# Patient Record
Sex: FEMALE | Race: WHITE | NOT HISPANIC OR LATINO | Employment: FULL TIME | URBAN - METROPOLITAN AREA
[De-identification: names, ages, dates, MRNs, and addresses within clinical notes are randomized per-mention and may not be internally consistent; named-entity substitution may affect disease eponyms.]

---

## 2021-04-09 DIAGNOSIS — Z23 ENCOUNTER FOR IMMUNIZATION: ICD-10-CM

## 2022-03-11 ENCOUNTER — ANNUAL EXAM (OUTPATIENT)
Dept: OBGYN CLINIC | Facility: CLINIC | Age: 62
End: 2022-03-11
Payer: COMMERCIAL

## 2022-03-11 VITALS
SYSTOLIC BLOOD PRESSURE: 110 MMHG | WEIGHT: 153 LBS | DIASTOLIC BLOOD PRESSURE: 70 MMHG | BODY MASS INDEX: 23.19 KG/M2 | HEIGHT: 68 IN

## 2022-03-11 DIAGNOSIS — Z11.3 ROUTINE SCREENING FOR STI (SEXUALLY TRANSMITTED INFECTION): ICD-10-CM

## 2022-03-11 DIAGNOSIS — Z12.11 SCREEN FOR COLON CANCER: ICD-10-CM

## 2022-03-11 DIAGNOSIS — Z12.31 ENCOUNTER FOR SCREENING MAMMOGRAM FOR MALIGNANT NEOPLASM OF BREAST: ICD-10-CM

## 2022-03-11 DIAGNOSIS — Z12.4 SCREENING FOR CERVICAL CANCER: ICD-10-CM

## 2022-03-11 DIAGNOSIS — Z01.419 WOMEN'S ANNUAL ROUTINE GYNECOLOGICAL EXAMINATION: Primary | ICD-10-CM

## 2022-03-11 PROCEDURE — S0612 ANNUAL GYNECOLOGICAL EXAMINA: HCPCS | Performed by: STUDENT IN AN ORGANIZED HEALTH CARE EDUCATION/TRAINING PROGRAM

## 2022-03-11 RX ORDER — FAMOTIDINE 20 MG/1
20 TABLET, FILM COATED ORAL 2 TIMES DAILY
COMMUNITY

## 2022-03-11 NOTE — PROGRESS NOTES
[address], [address], [facility], [address] [facility]    ASSESSMENT/PLAN: Chiara Jackman is a 65 y o  [id number] who presents for annual gynecologic exam     Encounter for routine gynecologic examination  - Routine well woman exam completed today  - Cervical Cancer Screening: Current ASCCP Guidelines reviewed  Last Pap: 11/30/2017   History of abnormal: None  Repeat collected today    - STI screening offered including HIV testing: GC/CT and blood work ordered  - Breast Cancer Screening: Last Mammogram Not on file, overdue for repeat  Ordered today  - Colorectal cancer screening was ordered  - The following were reviewed in today's visit: breast self exam, mammography screening ordered, menopause, adequate intake of calcium and vitamin D, exercise and healthy diet    Additional problems addressed during this visit:  1  Women's annual routine gynecological examination    2  Encounter for screening mammogram for malignant neoplasm of breast  -     Mammo screening bilateral w 3d & cad; Future    3  Screening for cervical cancer  -     IGP,CtNg,AptimaHPV,rfx16/18,45    4  Screen for colon cancer  -     Ambulatory Referral to Gastroenterology; Future    5  Routine screening for STI (sexually transmitted infection)  -     IGP,CtNg,AptimaHPV,rfx16/18,45  -     Human Immunodeficiency Virus 1/2 Antigen / Antibody ( Fourth Generation) with Reflex Testing; Future  -     RPR, Rfx Qn RPR/Confirm TP; Future  -     Hepatitis C antibody; Future  -     Human Immunodeficiency Virus 1/2 Antigen / Antibody ( Fourth Generation) with Reflex Testing  -     RPR, Rfx Qn RPR/Confirm TP  -     Hepatitis C antibody        CC:  Annual Gynecologic Examination    HPI: Chiara Jackman is a 65 y o  [id number] who presents for annual gynecologic examination  She is without complaint today  ROS: Negative except as noted in HPI    No LMP recorded   Patient is postmenopausal   Menstrual History  Period Duration (Days): Menopausal x 3-5 years    She  reports being sexually active and has had partner(s) who are male  She reports using the following method of birth control/protection: Male Sterilization  Sexual History  Sexual Assault: No  Sexually Transmitted Infection History: None  Multiple Sex Partners: No  Is Patient in a Monogamous Relationship?: Yes    The following portions of the patient's history were reviewed and updated as appropriate:  Past Medical History:   Diagnosis Date    Peptic ulcer     Shingles     persistent ocular shingles - prednisone drops     Past Surgical History:   Procedure Laterality Date    COLONOSCOPY  10/2014    FRACTURE SURGERY Right     right arm fracture reconstruction    MAMMO (HISTORICAL)  10/07/2019    BIRADS 1 C    TONSILLECTOMY AND ADENOIDECTOMY       Family History   Problem Relation Age of Onset    Breast cancer Maternal Grandmother 72    Ovarian cancer Neg Hx     Uterine cancer Neg Hx     Colon cancer Neg Hx      Social History     Socioeconomic History    Marital status: /Civil Union     Spouse name: None    Number of children: None    Years of education: None    Highest education level: None   Occupational History    None   Tobacco Use    Smoking status: Former Smoker     Packs/day: 0 50     Quit date: 2012     Years since quitting: 10 1    Smokeless tobacco: Never Used   Vaping Use    Vaping Use: Never used   Substance and Sexual Activity    Alcohol use:  Yes     Alcohol/week: 6 0 standard drinks     Types: 6 Standard drinks or equivalent per week    Drug use: Never    Sexual activity: Yes     Partners: Male     Birth control/protection: Male Sterilization   Other Topics Concern    None   Social History Narrative    Exercise: up to 5 times a week    Domestic violence: No         Social Determinants of Health     Financial Resource Strain: Not on file   Food Insecurity: Not on file   Transportation Needs: Not on file   Physical Activity: Not on file   Stress: Not on file Social Connections: Not on file   Intimate Partner Violence: Not on file   Housing Stability: Not on file     Outpatient Medications Marked as Taking for the 3/11/22 encounter (Annual Exam) with Aj William MD   Medication    famotidine (PEPCID) 20 mg tablet     No Known Allergies        Objective:  /70 (BP Location: Left arm, Patient Position: Sitting, Cuff Size: Standard)   Ht 5' 8" (1 727 m)   Wt 69 4 kg (153 lb)   BMI 23 26 kg/m²        Chaperone present? Yes: Lissette Apodaca MA  General Appearance: alert and oriented, in no acute distress  Neck/Thyroid: No thyromegaly, no thyroid nodules  Respiratory: Unlabored breathing  Cardiovascular: Regular rate, no peripheral edema  Abdomen: Soft, non-tender, non-distended, no masses, no rebound or guarding  Breast Exam: No dimpling, nipple retraction or discharge  No lumps or masses  No axillary or supraclavicular nodes  Pelvic:       External genitalia: Normal appearance, no abnormal pigmentation, no lesions or masses  Normal Bartholin's and [place]'s  Urinary system: Urethral meatus normal, bladder non-tender  Vaginal: normal mucosa without prolapse or lesions  Normal-appearing physiologic discharge  Cervix: Normal-appearing, well-epithelialized, no gross lesions or masses  No cervical motion tenderness  Adnexa: No adnexal masses or tenderness noted  Uterus: Normal-sized, regular contour, midline, mobile, no uterine tenderness  Extremities: Normal range of motion  Warm, well-perfused, non-tender     Skin: normal, no rash or abnormalities  Neurologic: alert, oriented x3  Psychiatric: Appropriate affect, mood stable, cooperative with exam     Rolo Blood MD  3/11/2022 8:25 AM

## 2022-03-16 LAB
C TRACH RRNA CVX QL NAA+PROBE: NEGATIVE
CYTOLOGIST CVX/VAG CYTO: NORMAL
DX ICD CODE: NORMAL
HPV I/H RISK 4 DNA CVX QL PROBE+SIG AMP: NEGATIVE
N GONORRHOEA RRNA CVX QL NAA+PROBE: NEGATIVE
OTHER STN SPEC: NORMAL
PATH REPORT.FINAL DX SPEC: NORMAL
SL AMB NOTE:: NORMAL
SL AMB SPECIMEN ADEQUACY: NORMAL
SL AMB TEST METHODOLOGY: NORMAL

## 2022-04-29 ENCOUNTER — TELEPHONE (OUTPATIENT)
Dept: GASTROENTEROLOGY | Facility: CLINIC | Age: 62
End: 2022-04-29

## 2022-06-15 DIAGNOSIS — Z12.31 ENCOUNTER FOR SCREENING MAMMOGRAM FOR MALIGNANT NEOPLASM OF BREAST: ICD-10-CM

## 2022-06-17 LAB
EXTERNAL HIV SCREEN: NORMAL
HBA1C MFR BLD HPLC: 5.4 %
HCV AB SER-ACNC: NEGATIVE

## 2022-06-18 LAB
HCV AB S/CO SERPL IA: <0.1 S/CO RATIO (ref 0–0.9)
HIV 1+2 AB+HIV1 P24 AG SERPL QL IA: NON REACTIVE
RPR SER QL: NON REACTIVE

## 2022-07-22 ENCOUNTER — TELEPHONE (OUTPATIENT)
Dept: GASTROENTEROLOGY | Facility: AMBULARY SURGERY CENTER | Age: 62
End: 2022-07-22

## 2022-07-22 NOTE — TELEPHONE ENCOUNTER
Called pt back and discussed the need for office visit to evaluate ongoing stomach issues and gerd- canceled colonoscopy and will start with 9/12 office visit with Russell Conde in [place] and colon/egd can be arranged if ordered    Pt agreeable to this plan

## 2022-07-22 NOTE — TELEPHONE ENCOUNTER
Patients GI provider:  Dr Ting Knox     Number to return call: [phone]     Reason for call: Pt calling requesting to speak with someone regarding adding EGD with the colonoscopy scheduled on 8/12/22     Scheduled procedure/appointment date if applicable: Apt/procedure 8/12/22

## 2022-09-12 ENCOUNTER — TELEPHONE (OUTPATIENT)
Dept: GASTROENTEROLOGY | Facility: CLINIC | Age: 62
End: 2022-09-12

## 2022-09-12 ENCOUNTER — OFFICE VISIT (OUTPATIENT)
Dept: GASTROENTEROLOGY | Facility: CLINIC | Age: 62
End: 2022-09-12
Payer: COMMERCIAL

## 2022-09-12 VITALS
WEIGHT: 155 LBS | HEIGHT: 68 IN | SYSTOLIC BLOOD PRESSURE: 108 MMHG | HEART RATE: 59 BPM | DIASTOLIC BLOOD PRESSURE: 60 MMHG | BODY MASS INDEX: 23.49 KG/M2

## 2022-09-12 DIAGNOSIS — K21.9 GASTROESOPHAGEAL REFLUX DISEASE, UNSPECIFIED WHETHER ESOPHAGITIS PRESENT: Primary | ICD-10-CM

## 2022-09-12 DIAGNOSIS — Z12.11 SCREENING FOR COLON CANCER: ICD-10-CM

## 2022-09-12 DIAGNOSIS — Z12.11 SCREENING FOR COLON CANCER: Primary | ICD-10-CM

## 2022-09-12 DIAGNOSIS — R13.19 ESOPHAGEAL DYSPHAGIA: ICD-10-CM

## 2022-09-12 PROCEDURE — 99203 OFFICE O/P NEW LOW 30 MIN: CPT | Performed by: REGISTERED NURSE

## 2022-09-12 RX ORDER — VALACYCLOVIR HYDROCHLORIDE 1 G/1
1000 TABLET, FILM COATED ORAL 2 TIMES DAILY
COMMUNITY
Start: 2022-08-24

## 2022-09-12 NOTE — PROGRESS NOTES
[id number] - Outpatient Consultation  Lucy Neely 65 y o  female MRN: [id number]  Encounter: [id number]    ASSESSMENT AND PLAN:      1  Gastroesophageal reflux disease, unspecified whether esophagitis present  2  Esophageal dysphagia  Increasing GERD symptoms over the past year and a half to 2 years  No real risk factors for peptic ulcer disease  She is concerned about celiac and H pylori so we discussed biopsies during upper endoscopy  In the meantime we discussed anti-reflux diet as well as dysphagia diet since she complains of intermittent dysphagia with hummus and crackers  Differential diagnosis likely gastritis  Okay to take Tums and famotidine p r n  Yoana Skipper - EGD at [facility])    3  Screening for colon cancer  Previous colonoscopy in 2012 with a 10 year recall  Currently due for colonoscopy  - Colonoscopy at [facility])  - Sod Picosulfate-Mag Ox-Cit Acd 10-3 5-12 MG-GM -GM/160ML SOLN; Take 320 mL by mouth once for 1 dose  Dispense: 320 mL; Refill: 0      Followup Appointment: 3 months  ______________________________________________________________________    Chief Complaint   Patient presents with    GERD for the past year, Dysphagia     Referred by Dr Meg Lewis         HPI:   Lucy Neely is a 65y o  year old female who presents with complaints of GERD as well as due for colonoscopy  She states that she has been having GERD symptoms for the past year and a half to 2 years  This past December she had a period where her abdomen was extremely distended for about 3 days  Her PCP gave her Pepcid and steroids which seemed to help  She took the Pepcid for about 3 months without any further attacks  She does however feel intermittent upper abdominal contractions  She has also experienced more acid reflux  She did decrease her fried foods  Intermittent episodes of water brash especially if eating late in the evening    She does endorse having a few alcoholic beverages almost daily   She also admits to slight globus sensation as well as dysphagia intermittently when eating foods such as hummus and crackers  Denies any change in her bowel habits or rectal bleeding  Her appetite is good her weight is stable  Historical Information   Past Medical History:   Diagnosis Date    Peptic ulcer     Shingles     persistent ocular shingles - prednisone drops     Past Surgical History:   Procedure Laterality Date    COLONOSCOPY  10/2014    FRACTURE SURGERY Right     right arm fracture reconstruction    MAMMO (HISTORICAL)  10/07/2019    BIRADS 1 C    TONSILLECTOMY AND ADENOIDECTOMY       Social History     Substance and Sexual Activity   Alcohol Use Yes    Alcohol/week: 6 0 standard drinks    Types: 6 Standard drinks or equivalent per week     Social History     Substance and Sexual Activity   Drug Use Never     Social History     Tobacco Use   Smoking Status Former Smoker    Packs/day: 0 50    Quit date: 2012    Years since quitting: 10 7   Smokeless Tobacco Never Used     Family History   Problem Relation Age of Onset    Breast cancer Maternal Grandmother 72    Ovarian cancer Neg Hx     Uterine cancer Neg Hx     Colon cancer Neg Hx        Meds/Allergies     Current Outpatient Medications:     famotidine (PEPCID) 20 mg tablet    Sod Picosulfate-Mag Ox-Cit Acd 10-3 5-12 MG-GM -GM/160ML SOLN    valACYclovir (VALTREX) 1,000 mg tablet    No Known Allergies    PHYSICAL EXAM:    Blood pressure 108/60, pulse 59, height 5' 8" (1 727 m), weight 70 3 kg (155 lb)  Body mass index is 23 57 kg/m²  General Appearance: NAD, cooperative, alert  Eyes: Anicteric, PERRLA, EOMI  ENT:  Normocephalic, atraumatic, normal mucosa  Neck:  Supple, symmetrical, trachea midline,   Resp:  Clear to auscultation bilaterally; no rales, rhonchi or wheezing; respirations unlabored   CV:  S1 S2, Regular rate and rhythm; no murmur, rub, or gallop    GI:  Soft, non-tender, non-distended; normal bowel sounds; no masses, no organomegaly   Rectal: Deferred  Musculoskeletal: No cyanosis, clubbing or edema  Normal ROM  Skin:  No jaundice, rashes, or lesions   Heme/Lymph: No palpable cervical lymphadenopathy  Psych: Normal affect, good eye contact  Neuro: No gross deficits, AAOx3    Lab Results:   Reviewed    Radiology Results:   No results found  REVIEW OF SYSTEMS:    CONSTITUTIONAL: Denies any fever, chills, rigors, and weight loss  HEENT: No earache or tinnitus  Denies hearing loss or visual disturbances  CARDIOVASCULAR: No chest pain or palpitations  RESPIRATORY: Denies any cough, hemoptysis, shortness of breath or dyspnea on exertion  GASTROINTESTINAL: As noted in the History of Present Illness  GENITOURINARY: No problems with urination  Denies any hematuria or dysuria  NEUROLOGIC: No dizziness or vertigo, denies headaches  MUSCULOSKELETAL: Denies any muscle or joint pain  SKIN: Denies skin rashes or itching  ENDOCRINE: Denies excessive thirst  Denies intolerance to heat or cold  PSYCHOSOCIAL: Denies depression or anxiety  Denies any recent memory loss

## 2022-09-12 NOTE — TELEPHONE ENCOUNTER
Scheduled date of colonoscopy (as of today):10/20/22  Physician performing colonoscopy:Dr Tami Boyer  Location of colonoscopy:[facility]  Bowel prep reviewed with patient:Clenpiq  Instructions reviewed with patient by: David Cordero  Clearances: No

## 2022-10-17 RX ORDER — SODIUM PICOSULFATE, MAGNESIUM OXIDE, AND ANHYDROUS CITRIC ACID 10; 3.5; 12 MG/160ML; G/160ML; G/160ML
LIQUID ORAL
Qty: 320 ML | Refills: 0 | Status: SHIPPED | OUTPATIENT
Start: 2022-10-17 | End: 2022-10-20 | Stop reason: HOSPADM

## 2022-10-18 ENCOUNTER — TELEPHONE (OUTPATIENT)
Dept: GASTROENTEROLOGY | Facility: AMBULATORY SURGERY CENTER | Age: 62
End: 2022-10-18

## 2022-10-19 ENCOUNTER — ANESTHESIA (OUTPATIENT)
Dept: ANESTHESIOLOGY | Facility: AMBULATORY SURGERY CENTER | Age: 62
End: 2022-10-19

## 2022-10-19 ENCOUNTER — ANESTHESIA EVENT (OUTPATIENT)
Dept: ANESTHESIOLOGY | Facility: AMBULATORY SURGERY CENTER | Age: 62
End: 2022-10-19

## 2022-10-20 ENCOUNTER — ANESTHESIA EVENT (OUTPATIENT)
Dept: GASTROENTEROLOGY | Facility: AMBULATORY SURGERY CENTER | Age: 62
End: 2022-10-20

## 2022-10-20 ENCOUNTER — ANESTHESIA (OUTPATIENT)
Dept: GASTROENTEROLOGY | Facility: AMBULATORY SURGERY CENTER | Age: 62
End: 2022-10-20

## 2022-10-20 ENCOUNTER — HOSPITAL ENCOUNTER (OUTPATIENT)
Dept: GASTROENTEROLOGY | Facility: AMBULATORY SURGERY CENTER | Age: 62
Discharge: HOME | End: 2022-10-20

## 2022-10-20 VITALS
HEIGHT: 68 IN | HEART RATE: 58 BPM | DIASTOLIC BLOOD PRESSURE: 74 MMHG | RESPIRATION RATE: 18 BRPM | SYSTOLIC BLOOD PRESSURE: 119 MMHG | WEIGHT: 148 LBS | TEMPERATURE: 96.8 F | BODY MASS INDEX: 22.43 KG/M2 | OXYGEN SATURATION: 100 %

## 2022-10-20 DIAGNOSIS — Z12.11 SCREENING FOR COLON CANCER: ICD-10-CM

## 2022-10-20 DIAGNOSIS — R13.19 ESOPHAGEAL DYSPHAGIA: ICD-10-CM

## 2022-10-20 DIAGNOSIS — K21.9 GASTROESOPHAGEAL REFLUX DISEASE, UNSPECIFIED WHETHER ESOPHAGITIS PRESENT: ICD-10-CM

## 2022-10-20 RX ORDER — PROPOFOL 10 MG/ML
INJECTION, EMULSION INTRAVENOUS AS NEEDED
Status: DISCONTINUED | OUTPATIENT
Start: 2022-10-20 | End: 2022-10-20

## 2022-10-20 RX ORDER — LIDOCAINE HYDROCHLORIDE 10 MG/ML
INJECTION, SOLUTION EPIDURAL; INFILTRATION; INTRACAUDAL; PERINEURAL AS NEEDED
Status: DISCONTINUED | OUTPATIENT
Start: 2022-10-20 | End: 2022-10-20

## 2022-10-20 RX ORDER — SODIUM CHLORIDE, SODIUM LACTATE, POTASSIUM CHLORIDE, CALCIUM CHLORIDE 600; 310; 30; 20 MG/100ML; MG/100ML; MG/100ML; MG/100ML
50 INJECTION, SOLUTION INTRAVENOUS CONTINUOUS
Status: DISCONTINUED | OUTPATIENT
Start: 2022-10-20 | End: 2022-10-24 | Stop reason: HOSPADM

## 2022-10-20 RX ADMIN — PROPOFOL 40 MG: 10 INJECTION, EMULSION INTRAVENOUS at 08:48

## 2022-10-20 RX ADMIN — PROPOFOL 50 MG: 10 INJECTION, EMULSION INTRAVENOUS at 08:29

## 2022-10-20 RX ADMIN — PROPOFOL 50 MG: 10 INJECTION, EMULSION INTRAVENOUS at 08:18

## 2022-10-20 RX ADMIN — PROPOFOL 150 MG: 10 INJECTION, EMULSION INTRAVENOUS at 08:09

## 2022-10-20 RX ADMIN — PROPOFOL 30 MG: 10 INJECTION, EMULSION INTRAVENOUS at 08:34

## 2022-10-20 RX ADMIN — PROPOFOL 50 MG: 10 INJECTION, EMULSION INTRAVENOUS at 08:41

## 2022-10-20 RX ADMIN — PROPOFOL 30 MG: 10 INJECTION, EMULSION INTRAVENOUS at 08:50

## 2022-10-20 RX ADMIN — PROPOFOL 50 MG: 10 INJECTION, EMULSION INTRAVENOUS at 08:44

## 2022-10-20 RX ADMIN — PROPOFOL 40 MG: 10 INJECTION, EMULSION INTRAVENOUS at 08:33

## 2022-10-20 RX ADMIN — PROPOFOL 20 MG: 10 INJECTION, EMULSION INTRAVENOUS at 08:51

## 2022-10-20 RX ADMIN — PROPOFOL 30 MG: 10 INJECTION, EMULSION INTRAVENOUS at 08:12

## 2022-10-20 RX ADMIN — PROPOFOL 50 MG: 10 INJECTION, EMULSION INTRAVENOUS at 08:23

## 2022-10-20 RX ADMIN — SODIUM CHLORIDE, SODIUM LACTATE, POTASSIUM CHLORIDE, CALCIUM CHLORIDE 50 ML/HR: 600; 310; 30; 20 INJECTION, SOLUTION INTRAVENOUS at 07:51

## 2022-10-20 RX ADMIN — PROPOFOL 50 MG: 10 INJECTION, EMULSION INTRAVENOUS at 08:38

## 2022-10-20 RX ADMIN — PROPOFOL 50 MG: 10 INJECTION, EMULSION INTRAVENOUS at 08:20

## 2022-10-20 RX ADMIN — LIDOCAINE HYDROCHLORIDE 50 MG: 10 INJECTION, SOLUTION EPIDURAL; INFILTRATION; INTRACAUDAL; PERINEURAL at 08:09

## 2022-10-20 NOTE — ANESTHESIA PREPROCEDURE EVALUATION
Procedure:  PRE-OP ONLY    Relevant Problems   No relevant active problems             Anesthesia Plan  ASA Score- 2     Anesthesia Type- IV sedation with anesthesia with ASA Monitors  Additional Monitors:   Airway Plan:           Plan Factors-    Chart reviewed  Patient is not a current smoker  Induction- intravenous  Postoperative Plan-     Informed Consent- Anesthetic plan and risks discussed with patient  I personally reviewed this patient with the CRNA  Discussed and agreed on the Anesthesia Plan with the CRNA  Humberto Penny

## 2022-10-20 NOTE — ANESTHESIA POSTPROCEDURE EVALUATION
Post-Op Assessment Note    CV Status:  Stable  Pain Score: 0    Pain management: adequate     Mental Status:  Arousable and sleepy   Hydration Status:  Stable and euvolemic   PONV Controlled:  Controlled   Airway Patency:  Patent      Post Op Vitals Reviewed: Yes      Staff: CRNA         No complications documented      BP   115/67   Temp     Pulse  65   Resp  16   SpO2   100

## 2022-10-20 NOTE — ANESTHESIA PREPROCEDURE EVALUATION
Procedure:  COLONOSCOPY  EGD    Relevant Problems   No relevant active problems        Physical Exam    Airway    Mallampati score: II  TM Distance: >3 FB  Neck ROM: full     Dental   No notable dental hx     Cardiovascular      Pulmonary      Other Findings        Anesthesia Plan  ASA Score- 2     Anesthesia Type- IV sedation with anesthesia with ASA Monitors  Additional Monitors:   Airway Plan:           Plan Factors-Exercise tolerance (METS): >4 METS  Chart reviewed  Patient is not a current smoker (Former)  Induction- intravenous  Postoperative Plan-     Informed Consent- Anesthetic plan and risks discussed with patient  I personally reviewed this patient with the CRNA  Discussed and agreed on the Anesthesia Plan with the CRNA  Sneha Ross

## 2022-12-19 ENCOUNTER — TELEPHONE (OUTPATIENT)
Dept: GASTROENTEROLOGY | Facility: CLINIC | Age: 62
End: 2022-12-19

## 2023-03-24 ENCOUNTER — OFFICE VISIT (OUTPATIENT)
Dept: GASTROENTEROLOGY | Facility: CLINIC | Age: 63
End: 2023-03-24

## 2023-03-24 VITALS
SYSTOLIC BLOOD PRESSURE: 118 MMHG | BODY MASS INDEX: 23.64 KG/M2 | WEIGHT: 156 LBS | DIASTOLIC BLOOD PRESSURE: 76 MMHG | HEIGHT: 68 IN

## 2023-03-24 DIAGNOSIS — Z12.11 SCREENING FOR COLON CANCER: ICD-10-CM

## 2023-03-24 DIAGNOSIS — K21.9 GASTROESOPHAGEAL REFLUX DISEASE WITHOUT ESOPHAGITIS: Primary | ICD-10-CM

## 2023-03-24 NOTE — PROGRESS NOTES
[address] [facility] - Outpatient Follow-up Note  Jing Stinson 59 y o  female MRN: [id number]  Encounter: [id number]    ASSESSMENT AND PLAN:      1  Gastroesophageal reflux disease without esophagitis  EGD negative for Nuñez's  Patient controlling her reflux with diet alone  No longer on Pepcid     -Continue GERD lifestyle modifications  -Okay to use Tums as needed    2  Screening for colon cancer  Average risk, up-to-date  Recall 2032      Followup Appointment: As needed  ______________________________________________________________________    Chief Complaint   Patient presents with   • Follow-up     No issues     HPI: 20-year-old female here today for follow-up  Was seen in the office last  for reflux and dysphagia  EGD and colonoscopy done in 2022  Biopsies okay  Since then, patient has been really watching her diet  No longer on Pepcid  Making sure she is eating healthy  No longer smoker, quit 20 years ago  Occasionally drinks alcohol  Bowels are regular with celery juice alone  Denies any bleeding or weight loss      Historical Information   Past Medical History:   Diagnosis Date   • Peptic ulcer    • Shingles     persistent ocular shingles - prednisone drops     Past Surgical History:   Procedure Laterality Date   • COLONOSCOPY  10/2014   • COLONOSCOPY     • FRACTURE SURGERY Right     right arm fracture reconstruction   • MAMMO (HISTORICAL)  10/07/2019    BIRADS 1 C   • TONSILLECTOMY AND ADENOIDECTOMY     • UPPER GASTROINTESTINAL ENDOSCOPY       Social History     Substance and Sexual Activity   Alcohol Use Yes   • Alcohol/week: 6 0 standard drinks   • Types: 6 Standard drinks or equivalent per week     Social History     Substance and Sexual Activity   Drug Use Never     Social History     Tobacco Use   Smoking Status Former   • Packs/day: 0 50   • Types: Cigarettes   • Quit date:    • Years since quittin 2   Smokeless Tobacco Never Family History   Problem Relation Age of Onset   • Breast cancer Maternal Grandmother 72   • Ovarian cancer Neg Hx    • Uterine cancer Neg Hx    • Colon cancer Neg Hx    • Colon polyps Neg Hx          Current Outpatient Medications:   •  Cyanocobalamin (VITAMIN B 12 PO)  •  Multiple Vitamins-Minerals (ZINC PO)  •  NON FORMULARY  •  Omega-3 Fatty Acids (FISH OIL PO)  •  valACYclovir (VALTREX) 1,000 mg tablet  No Known Allergies  Reviewed medications and allergies and updated as indicated    PHYSICAL EXAM:    Blood pressure 118/76, height 5' 8" (1 727 m), weight 70 8 kg (156 lb)  Body mass index is 23 72 kg/m²  General Appearance: NAD, cooperative, alert  Eyes: Anicteric, PERRLA, EOMI  ENT:  Normocephalic, atraumatic, normal mucosa  Neck:  Supple, symmetrical, trachea midline  Resp:  Clear to auscultation bilaterally; no rales, rhonchi or wheezing; respirations unlabored   CV:  S1 S2, Regular rate and rhythm; no murmur, rub, or gallop  GI:  Soft, non-tender, non-distended; normal bowel sounds; no masses, no organomegaly   Rectal: Deferred  Musculoskeletal: No cyanosis, clubbing or edema  Normal ROM  Skin:  No jaundice, rashes, or lesions   Heme/Lymph: No palpable cervical lymphadenopathy  Psych: Normal affect, good eye contact  Neuro: No gross deficits, AAOx3    Lab Results:   No results found for: WBC, HGB, HCT, MCV, PLT  No results found for: NA, K, CL, CO2, ANIONGAP, BUN, CREATININE, GLUCOSE, GLUF, CALCIUM, CORRECTEDCA, AST, ALT, ALKPHOS, PROT, BILITOT, EGFR  No results found for: IRON, TIBC, FERRITIN  No results found for: LIPASE    Radiology Results:   No results found

## 2023-05-23 PROBLEM — Z12.11 SCREENING FOR COLON CANCER: Status: RESOLVED | Noted: 2023-03-24 | Resolved: 2023-05-23

## 2023-10-04 ENCOUNTER — ANNUAL EXAM (OUTPATIENT)
Dept: OBGYN CLINIC | Facility: CLINIC | Age: 63
End: 2023-10-04
Payer: COMMERCIAL

## 2023-10-04 VITALS
BODY MASS INDEX: 23.64 KG/M2 | WEIGHT: 156 LBS | DIASTOLIC BLOOD PRESSURE: 70 MMHG | SYSTOLIC BLOOD PRESSURE: 110 MMHG | HEIGHT: 68 IN

## 2023-10-04 DIAGNOSIS — Z01.419 ENCOUNTER FOR ANNUAL ROUTINE GYNECOLOGICAL EXAMINATION: Primary | ICD-10-CM

## 2023-10-04 DIAGNOSIS — Z12.31 ENCOUNTER FOR SCREENING MAMMOGRAM FOR MALIGNANT NEOPLASM OF BREAST: ICD-10-CM

## 2023-10-04 PROCEDURE — S0612 ANNUAL GYNECOLOGICAL EXAMINA: HCPCS | Performed by: STUDENT IN AN ORGANIZED HEALTH CARE EDUCATION/TRAINING PROGRAM

## 2023-10-04 NOTE — PROGRESS NOTES
[address] [place] [address], [address], [facility], [address] [facility],    ASSESSMENT/PLAN: Ariadna Fitzgerald is a 59 y.o. [id number] who presents for annual gynecologic exam.    Encounter for routine gynecologic examination  - Routine well woman exam completed today. - Cervical Cancer Screening: Current ASCCP Guidelines reviewed. Last Pap: 03/11/2022. History of abnormal: None.  - STI screening offered including HIV testing: offered, pt declined  - Breast Cancer Screening: Last Mammogram 05/31/2022, repeat ordered  - Colorectal cancer screening was not ordered, as she is up to date. - The following were reviewed in today's visit: breast self exam, mammography screening ordered, exercise and healthy diet    Additional problems addressed during this visit:  1. Encounter for annual routine gynecological examination    2. Encounter for screening mammogram for malignant neoplasm of breast  -     Mammo screening bilateral w 3d & cad; Future        CC:  Annual Gynecologic Examination    HPI: Ariadna Fitzgerald is a 59 y.o. [id number] who presents for annual gynecologic examination. She is without complaint today. ROS: Negative except as noted in HPI    No LMP recorded. Patient is postmenopausal.       She  reports being sexually active and has had partner(s) who are male. She reports using the following method of birth control/protection: Male Sterilization.        The following portions of the patient's history were reviewed and updated as appropriate:  Past Medical History:   Diagnosis Date   • Peptic ulcer    • Shingles     persistent ocular shingles - prednisone drops     Past Surgical History:   Procedure Laterality Date   • COLONOSCOPY  10/2014   • COLONOSCOPY     • FRACTURE SURGERY Right     right arm fracture reconstruction   • TONSILLECTOMY AND ADENOIDECTOMY     • UPPER GASTROINTESTINAL ENDOSCOPY       Family History   Problem Relation Age of Onset   • Breast cancer Maternal Grandmother 72   • Ovarian cancer Neg Hx • Uterine cancer Neg Hx    • Colon cancer Neg Hx    • Colon polyps Neg Hx      Social History     Socioeconomic History   • Marital status: /Civil Union     Spouse name: None   • Number of children: None   • Years of education: None   • Highest education level: None   Occupational History   • None   Tobacco Use   • Smoking status: Former     Packs/day: 0.50     Types: Cigarettes     Quit date:      Years since quittin.7     Passive exposure: Never   • Smokeless tobacco: Never   Vaping Use   • Vaping Use: Never used   Substance and Sexual Activity   • Alcohol use: Yes     Alcohol/week: 6.0 standard drinks of alcohol     Types: 6 Standard drinks or equivalent per week   • Drug use: Never   • Sexual activity: Yes     Partners: Male     Birth control/protection: Male Sterilization   Other Topics Concern   • None   Social History Narrative    Exercise: up to 5 times a week    Domestic violence: No         Social Determinants of Health     Financial Resource Strain: Not on file   Food Insecurity: Not on file   Transportation Needs: Not on file   Physical Activity: Not on file   Stress: Not on file   Social Connections: Not on file   Intimate Partner Violence: Not on file   Housing Stability: Not on file     Outpatient Medications Marked as Taking for the 10/4/23 encounter (Annual Exam) with Wilber Lesches, MD   Medication   • Cyanocobalamin (VITAMIN B 12 PO)   • Multiple Vitamins-Minerals (ZINC PO)   • Omega-3 Fatty Acids (FISH OIL PO)   • valACYclovir (VALTREX) 1,000 mg tablet     No Known Allergies        Objective:  /70 (BP Location: Left arm, Patient Position: Sitting, Cuff Size: Standard)   Ht 5' 8" (1.727 m)   Wt 70.8 kg (156 lb)   BMI 23.72 kg/m²        Chaperone present? Yes: Cece Thomas MA. General Appearance: alert and oriented, in no acute distress. Neck/Thyroid: No thyromegaly, no thyroid nodules. Respiratory: Unlabored breathing.   Cardiovascular: Regular rate, no peripheral edema. Abdomen: Soft, non-tender, non-distended, no masses, no rebound or guarding. Breast Exam: No dimpling, nipple retraction or discharge. No lumps or masses. No axillary or supraclavicular nodes. Pelvic:       External genitalia: Normal appearance, no abnormal pigmentation, no lesions or masses. Normal Bartholin's and [place]'s. Urinary system: Urethral meatus normal, bladder non-tender. Vaginal: atrophic mucosa without prolapse or lesions. Normal-appearing physiologic discharge. Cervix: Normal-appearing, well-epithelialized, no gross lesions or masses. No cervical motion tenderness. Adnexa: No adnexal masses or tenderness noted. Uterus: Normal-sized, regular contour, midline, mobile, no uterine tenderness. Extremities: Normal range of motion. Warm, well-perfused, non-tender.    Skin: normal, no rash or abnormalities  Neurologic: alert, oriented x3  Psychiatric: Appropriate affect, mood stable, cooperative with exam.    Alex Gustafson MD  10/4/2023 6:20 PM

## 2023-11-14 ENCOUNTER — HOSPITAL ENCOUNTER (OUTPATIENT)
Dept: MAMMOGRAPHY | Facility: CLINIC | Age: 63
Discharge: HOME | End: 2023-11-14
Payer: COMMERCIAL

## 2023-11-14 VITALS — BODY MASS INDEX: 23.66 KG/M2 | WEIGHT: 156.09 LBS | HEIGHT: 68 IN

## 2023-11-14 DIAGNOSIS — Z12.31 ENCOUNTER FOR SCREENING MAMMOGRAM FOR MALIGNANT NEOPLASM OF BREAST: ICD-10-CM

## 2023-11-14 PROCEDURE — 77067 SCR MAMMO BI INCL CAD: CPT

## 2023-11-14 PROCEDURE — 77063 BREAST TOMOSYNTHESIS BI: CPT

## 2024-06-13 ENCOUNTER — NURSE TRIAGE (OUTPATIENT)
Age: 64
End: 2024-06-13

## 2024-06-13 NOTE — TELEPHONE ENCOUNTER
Regarding: possible uti  ----- Message from Luz Elena SR sent at 6/13/2024  9:05 AM EDT -----  Patient is having symptoms of chills, urgency, burning

## 2025-03-31 ENCOUNTER — HOSPITAL ENCOUNTER (OUTPATIENT)
Dept: CT IMAGING | Facility: HOSPITAL | Age: 65
Discharge: HOME | End: 2025-03-31
Payer: COMMERCIAL

## 2025-03-31 DIAGNOSIS — R93.1 HIGH CORONARY ARTERY CALCIUM SCORE: ICD-10-CM

## 2025-03-31 PROCEDURE — 75571 CT HRT W/O DYE W/CA TEST: CPT

## 2025-06-05 ENCOUNTER — ANNUAL EXAM (OUTPATIENT)
Dept: OBGYN CLINIC | Facility: CLINIC | Age: 65
End: 2025-06-05
Payer: COMMERCIAL

## 2025-06-05 VITALS
DIASTOLIC BLOOD PRESSURE: 54 MMHG | HEIGHT: 67 IN | WEIGHT: 155.2 LBS | BODY MASS INDEX: 24.36 KG/M2 | SYSTOLIC BLOOD PRESSURE: 96 MMHG

## 2025-06-05 DIAGNOSIS — Z12.31 ENCOUNTER FOR SCREENING MAMMOGRAM FOR MALIGNANT NEOPLASM OF BREAST: ICD-10-CM

## 2025-06-05 DIAGNOSIS — Z01.419 ENCOUNTER FOR ANNUAL ROUTINE GYNECOLOGICAL EXAMINATION: Primary | ICD-10-CM

## 2025-06-05 PROCEDURE — S0612 ANNUAL GYNECOLOGICAL EXAMINA: HCPCS | Performed by: STUDENT IN AN ORGANIZED HEALTH CARE EDUCATION/TRAINING PROGRAM

## 2025-06-05 NOTE — PROGRESS NOTES
Name: Funmilayo Feliz      : 1960      MRN: [id number]  Encounter Provider: Tip Chen MD  Encounter Date: 2025   Encounter department: [facility] [place]  :  Assessment & Plan  Encounter for annual routine gynecological examination  - Routine well gynecologic exam completed today.  - Cervical Cancer Screening: Current ASCCP Guidelines reviewed. Last Pap: 2022. History of abnormal: None.  - STI screening offered including HIV testing: offered, pt declined  - Breast Cancer Screening: Last Mammogram 2023, repeat ordered  - Colorectal cancer screening was not ordered, as she is up to date. Next due .  - Osteoporosis screening:   Does the patient have one or more of the following risk factors?    1) Personal history of fragility fracture? No    2) BMI <20 or body weight less that 127 lbs? No    3) Any of the following medical causes of increased bone loss?   Rheumatoid arthritis, HIV, anorexia, diabetes mellitus (type 1 or 2), diminished ovarian reserve, gastric bypass/malabsorptive disorder, hyperparathyroidism, hypocalcemia, premature menopause, POI, renal impairment, Mccormack's syndrome, vitamin D deficiency? No  Medications: Antiepileptics, antiretrovirals, aromatase inhibitors, chemotherapy, DMPA, glucocorticoids, GnRH agonists/antagonists, heparin/LMWH? No    4) Parental history of hip fracture? No    5) Current smoker? No    6) Alcohol abuse (more than three drinks per day)? No   Based on above, the patient is at average risk of osteoporosis.    Recommendation: Baseline DXA at age 66.  - Reviewed option of pelvic PT for FADY. She declines at this time, given improvement in symptoms with Kegel's, but may reconsider next year.   - The following were reviewed in today's visit: breast self exam, mammography screening ordered, osteoporosis, adequate intake of calcium and vitamin D, exercise, and healthy diet       Encounter for screening mammogram for malignant  "neoplasm of breast    Orders:  •  Mammo screening bilateral w 3d and cad; Future        History of Present Illness   HPI  Funmilayo Feliz is a 65 y.o. female who presents for routine gynecologic preventative health visit. She reports mild FADY, which has improved substantially with Kegel's. Otherwise, she is without complaint today.   History obtained from: patient    Review of Systems   All other systems reviewed and are negative.    Medical History Reviewed by provider this encounter:  Tobacco  Med Hx  Surg Hx  Fam Hx  Soc Hx    .     Objective   BP 96/54 (BP Location: Left arm, Patient Position: Sitting, Cuff Size: Standard)   Ht 5' 7\" (1.702 m)   Wt 70.4 kg (155 lb 3.2 oz)   Breastfeeding No   BMI 24.31 kg/m²      Physical Exam  Vitals reviewed. Exam conducted with a chaperone present (Rebecca Durbin MA).   Constitutional:       General: She is not in acute distress.     Appearance: Normal appearance. She is well-developed.   HENT:      Head: Normocephalic.     Eyes:      Conjunctiva/sclera: Conjunctivae normal.       Cardiovascular:      Rate and Rhythm: Normal rate.   Pulmonary:      Effort: Pulmonary effort is normal. No respiratory distress.   Chest:      Chest wall: No mass or deformity.   Breasts:     Right: Normal. No mass, nipple discharge, skin change or tenderness.      Left: Normal. No mass, nipple discharge, skin change or tenderness.   Abdominal:      General: Abdomen is flat.      Palpations: Abdomen is soft.      Tenderness: There is no abdominal tenderness.   Genitourinary:     General: Normal vulva.      Exam position: Lithotomy position.      Labia:         Right: No tenderness, lesion or injury.         Left: No tenderness, lesion or injury.       Urethra: No urethral pain, urethral swelling or urethral lesion.      Vagina: Normal. No vaginal discharge, bleeding, lesions or prolapsed vaginal walls.      Cervix: Normal. No cervical motion tenderness, discharge, friability, lesion, " erythema or cervical bleeding.      Uterus: Normal. Not deviated and not tender.       Adnexa: Right adnexa normal and left adnexa normal.        Right: No mass, tenderness or fullness.          Left: No mass, tenderness or fullness.       Musculoskeletal:         General: No swelling.   Lymphadenopathy:      Upper Body:      Right upper body: No supraclavicular, axillary or pectoral adenopathy.      Left upper body: No supraclavicular, axillary or pectoral adenopathy.     Skin:     General: Skin is warm and dry.     Neurological:      General: No focal deficit present.      Mental Status: She is alert.     Psychiatric:         Mood and Affect: Mood normal.         Behavior: Behavior normal.         Thought Content: Thought content normal.         Judgment: Judgment normal.

## 2025-07-09 DIAGNOSIS — Z12.31 ENCOUNTER FOR SCREENING MAMMOGRAM FOR MALIGNANT NEOPLASM OF BREAST: ICD-10-CM
